# Patient Record
Sex: MALE | Race: OTHER
[De-identification: names, ages, dates, MRNs, and addresses within clinical notes are randomized per-mention and may not be internally consistent; named-entity substitution may affect disease eponyms.]

---

## 2018-06-09 ENCOUNTER — HOSPITAL ENCOUNTER (EMERGENCY)
Dept: HOSPITAL 72 - EMR | Age: 58
Discharge: NURSING, REHAB OR LONG-TERM CARE | End: 2018-06-09
Payer: MEDICARE

## 2018-06-09 VITALS — HEIGHT: 69 IN | BODY MASS INDEX: 26.66 KG/M2 | WEIGHT: 180 LBS

## 2018-06-09 VITALS — DIASTOLIC BLOOD PRESSURE: 97 MMHG | SYSTOLIC BLOOD PRESSURE: 173 MMHG

## 2018-06-09 VITALS — DIASTOLIC BLOOD PRESSURE: 92 MMHG | SYSTOLIC BLOOD PRESSURE: 157 MMHG

## 2018-06-09 DIAGNOSIS — J44.9: ICD-10-CM

## 2018-06-09 DIAGNOSIS — I10: ICD-10-CM

## 2018-06-09 DIAGNOSIS — L72.8: Primary | ICD-10-CM

## 2018-06-09 DIAGNOSIS — J45.909: ICD-10-CM

## 2018-06-09 DIAGNOSIS — Z86.19: ICD-10-CM

## 2018-06-09 PROCEDURE — 99284 EMERGENCY DEPT VISIT MOD MDM: CPT

## 2018-06-09 PROCEDURE — 10060 I&D ABSCESS SIMPLE/SINGLE: CPT
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
